# Patient Record
Sex: MALE | Race: WHITE | NOT HISPANIC OR LATINO | Employment: OTHER | URBAN - METROPOLITAN AREA
[De-identification: names, ages, dates, MRNs, and addresses within clinical notes are randomized per-mention and may not be internally consistent; named-entity substitution may affect disease eponyms.]

---

## 2023-08-14 ENCOUNTER — OFFICE VISIT (OUTPATIENT)
Dept: PRIMARY CARE CLINIC | Facility: CLINIC | Age: 33
End: 2023-08-14
Payer: OTHER GOVERNMENT

## 2023-08-14 VITALS
HEART RATE: 58 BPM | TEMPERATURE: 98 F | SYSTOLIC BLOOD PRESSURE: 104 MMHG | RESPIRATION RATE: 16 BRPM | WEIGHT: 198.38 LBS | HEIGHT: 71 IN | DIASTOLIC BLOOD PRESSURE: 68 MMHG | OXYGEN SATURATION: 98 % | BODY MASS INDEX: 27.77 KG/M2

## 2023-08-14 DIAGNOSIS — B35.4 TINEA CORPORIS: ICD-10-CM

## 2023-08-14 DIAGNOSIS — Z00.00 ROUTINE GENERAL MEDICAL EXAMINATION AT A HEALTH CARE FACILITY: Primary | ICD-10-CM

## 2023-08-14 PROCEDURE — 99385 PREV VISIT NEW AGE 18-39: CPT | Mod: S$GLB,,, | Performed by: INTERNAL MEDICINE

## 2023-08-14 PROCEDURE — 99385 PR PREVENTIVE VISIT,NEW,18-39: ICD-10-PCS | Mod: S$GLB,,, | Performed by: INTERNAL MEDICINE

## 2023-08-14 RX ORDER — KETOCONAZOLE 20 MG/G
CREAM TOPICAL DAILY
Qty: 30 G | Refills: 2 | Status: SHIPPED | OUTPATIENT
Start: 2023-08-14 | End: 2023-08-14 | Stop reason: SDUPTHER

## 2023-08-14 RX ORDER — KETOCONAZOLE 20 MG/G
CREAM TOPICAL DAILY
Qty: 30 G | Refills: 2 | Status: SHIPPED | OUTPATIENT
Start: 2023-08-14 | End: 2023-10-31

## 2023-08-14 NOTE — TELEPHONE ENCOUNTER
----- Message from Ellen Winchester sent at 8/14/2023  1:39 PM CDT -----  Contact: pt  Pt calling about script for ketoconazole (NIZORAL) 2 % cream pt needs [facility] because [facility] not take .    [facility]  [address] [place] [id number]  [phone]    Thanks,

## 2023-08-14 NOTE — PROGRESS NOTES
"  Subjective:      Patient ID: Abran Childers is a 33 y.o. male.    Chief Complaint: Rash (Pt c/o rash broke out all over body in June when he moved from [place] and its still on some parts of body.   The rash itch sometimes but most of the time it don't)    :  Patient with no known medical problem presented to establish care and for wellness exam     Patient also complains of rash on chest x 3 months.  Rashes irregular in shape just below the nipple on both sides, patient denies any itching oozing or discharge.  Patient thinks that the rash seems to be spreading/increasing.  Patient denies putting any over-the-counter medication    Review of Systems   Constitutional:  Negative for chills, diaphoresis, fever, malaise/fatigue and weight loss.   HENT:  Negative for congestion, ear pain, sinus pain, sore throat and tinnitus.    Eyes:  Negative for blurred vision and photophobia.   Respiratory:  Negative for cough, hemoptysis, shortness of breath and wheezing.    Cardiovascular:  Negative for chest pain, palpitations, orthopnea, leg swelling and PND.   Gastrointestinal:  Negative for abdominal pain, blood in stool, constipation, diarrhea, heartburn, melena, nausea and vomiting.   Genitourinary:  Negative for dysuria, frequency and urgency.   Musculoskeletal:  Negative for back pain, myalgias and neck pain.   Skin:  Positive for rash.   Neurological:  Negative for dizziness, tremors, seizures, loss of consciousness and weakness.   Endo/Heme/Allergies:  Negative for polydipsia.   Psychiatric/Behavioral:  Negative for depression and hallucinations. The patient does not have insomnia.      Objective:   /68 (BP Location: Left arm, Patient Position: Sitting, BP Method: Large (Automatic))   Pulse (!) 58   Temp 97.6 °F (36.4 °C) (Temporal)   Resp 16   Ht 5' 11" (1.803 m)   Wt 90 kg (198 lb 6.4 oz)   SpO2 98%   BMI 27.67 kg/m²     Physical Exam  Constitutional:       General: He is not in acute distress.     " Appearance: He is not diaphoretic.   Neck:      Thyroid: No thyromegaly.   Cardiovascular:      Rate and Rhythm: Normal rate and regular rhythm.      Heart sounds: Normal heart sounds. No murmur heard.  Pulmonary:      Effort: Pulmonary effort is normal. No respiratory distress.      Breath sounds: Normal breath sounds. No wheezing.   Abdominal:      General: Bowel sounds are normal. There is no distension.      Palpations: Abdomen is soft.      Tenderness: There is no abdominal tenderness.   Lymphadenopathy:      Cervical: No cervical adenopathy.   Skin:     Comments: Different size area of Yellowish discoloration of the skin noted on the chest. These are of different shapes with one circular lesion noted with central sparing and scaling on the left side of the chest.    Neurological:      Mental Status: He is alert and oriented to person, place, and time.   Psychiatric:         Behavior: Behavior normal.         Thought Content: Thought content normal.         Judgment: Judgment normal.       Assessment:       ICD-10-CM ICD-9-CM   1. Routine general medical examination at a health care facility  Z00.00 V70.0   2. Tinea corporis  B35.4 110.5       Plan:      Patient is here for wellness exam.  Will order annual  labs  Will screen for hepatitis-C and HIV  Patient has rash on the chest that is probably tinea corporis will treat with ketoconazole cream  Advised patient to keep the area dry and clean

## 2023-08-14 NOTE — TELEPHONE ENCOUNTER
----- Message from Luz Jain sent at 8/14/2023 11:37 AM CDT -----  Contact: Patient  Patient called to consult with nurse or staff regarding his medication. He wanted to speak with office about getting his medication called out to a pharmacy that accepts his insurance. He would like a call back regarding this and can be reached at [phone]. Thanks/

## 2023-10-31 ENCOUNTER — OFFICE VISIT (OUTPATIENT)
Dept: PRIMARY CARE CLINIC | Facility: CLINIC | Age: 33
End: 2023-10-31
Payer: OTHER GOVERNMENT

## 2023-10-31 VITALS
HEART RATE: 48 BPM | TEMPERATURE: 98 F | WEIGHT: 200.63 LBS | SYSTOLIC BLOOD PRESSURE: 110 MMHG | RESPIRATION RATE: 18 BRPM | BODY MASS INDEX: 28.09 KG/M2 | OXYGEN SATURATION: 98 % | DIASTOLIC BLOOD PRESSURE: 67 MMHG | HEIGHT: 71 IN

## 2023-10-31 DIAGNOSIS — J06.9 UPPER RESPIRATORY TRACT INFECTION, UNSPECIFIED TYPE: Primary | ICD-10-CM

## 2023-10-31 PROCEDURE — 99214 OFFICE O/P EST MOD 30 MIN: CPT | Mod: S$GLB,,, | Performed by: INTERNAL MEDICINE

## 2023-10-31 PROCEDURE — 99214 PR OFFICE/OUTPT VISIT, EST, LEVL IV, 30-39 MIN: ICD-10-PCS | Mod: S$GLB,,, | Performed by: INTERNAL MEDICINE

## 2023-10-31 RX ORDER — AZITHROMYCIN 250 MG/1
TABLET, FILM COATED ORAL
Qty: 6 TABLET | Refills: 0 | Status: SHIPPED | OUTPATIENT
Start: 2023-10-31 | End: 2023-11-05

## 2023-10-31 NOTE — PROGRESS NOTES
"  Subjective:      Patient ID: Abran Childers is a 33 y.o. male.    Chief Complaint: Sore Throat (Pt c/o sore throat and migraines x3 days. )    :  Patient with no known medical problem presented today with complains of sore throat for 3 days.  Patient denies any runny nose, nasal congestion, postnasal drip.  Patient denies any fever, chills, cough, wheezing or shortness of breath.  Patient reports some muscle ache and headaches.  Headache is mild-to-moderate in intensity, involving the whole head.  Patient denies any sick contact    Review of Systems   Constitutional:  Negative for chills, diaphoresis, fever, malaise/fatigue and weight loss.   HENT:  Positive for sore throat. Negative for congestion, ear pain, sinus pain and tinnitus.    Eyes:  Negative for blurred vision and photophobia.   Respiratory:  Negative for cough, hemoptysis, shortness of breath and wheezing.    Cardiovascular:  Negative for chest pain, palpitations, orthopnea, leg swelling and PND.   Gastrointestinal:  Negative for abdominal pain, blood in stool, constipation, diarrhea, heartburn, melena, nausea and vomiting.   Genitourinary:  Negative for dysuria, frequency and urgency.   Musculoskeletal:  Negative for back pain, myalgias and neck pain.   Skin:  Negative for rash.   Neurological:  Negative for dizziness, tremors, seizures, loss of consciousness and weakness.   Endo/Heme/Allergies:  Negative for polydipsia.   Psychiatric/Behavioral:  Negative for depression and hallucinations. The patient does not have insomnia.      Objective:   /67 (BP Location: Right arm, Patient Position: Sitting, BP Method: Large (Automatic))   Pulse (!) 48   Temp 97.9 °F (36.6 °C) (Oral)   Resp 18   Ht 5' 11" (1.803 m)   Wt 91 kg (200 lb 9.6 oz)   SpO2 98%   BMI 27.98 kg/m²     Physical Exam  Constitutional:       General: He is not in acute distress.     Appearance: He is not diaphoretic.   HENT:      Ears:      Comments: Bilateral tympanic membrane " are bulging.   Mild erythema of left tympanic membrane     Mouth/Throat:      Pharynx: No oropharyngeal exudate or posterior oropharyngeal erythema.   Neck:      Thyroid: No thyromegaly.   Cardiovascular:      Rate and Rhythm: Normal rate and regular rhythm.      Heart sounds: Normal heart sounds. No murmur heard.  Pulmonary:      Effort: Pulmonary effort is normal. No respiratory distress.      Breath sounds: Normal breath sounds. No wheezing.   Abdominal:      General: Bowel sounds are normal. There is no distension.      Palpations: Abdomen is soft.      Tenderness: There is no abdominal tenderness.   Lymphadenopathy:      Cervical: No cervical adenopathy.   Neurological:      Mental Status: He is alert and oriented to person, place, and time.   Psychiatric:         Behavior: Behavior normal.         Thought Content: Thought content normal.         Judgment: Judgment normal.       Assessment:       ICD-10-CM ICD-9-CM   1. Upper respiratory tract infection, unspecified type  J06.9 465.9       Plan:     Patient has viral URI.   Will treat with Z-Bibi  Advised patient to wear mask for next few days of viral infection  Advised to keep himself hydrated.   If patient develops fever chill or anything changes advised patient to call my office    Medication List with Changes/Refills   New Medications    AZITHROMYCIN (Z-BIBI) 250 MG TABLET    Take 2 tablets by mouth on day 1; Take 1 tablet by mouth on days 2-5   Discontinued Medications    KETOCONAZOLE (NIZORAL) 2 % CREAM    Apply topically once daily.

## 2024-04-23 ENCOUNTER — PATIENT MESSAGE (OUTPATIENT)
Dept: PRIMARY CARE CLINIC | Facility: CLINIC | Age: 34
End: 2024-04-23
Payer: OTHER GOVERNMENT

## 2024-04-23 NOTE — TELEPHONE ENCOUNTER
"Patient sent a msg via [employer], "Good Afternoon ,      I have been suffering with back & neck pain for some time now. I have been doing exercises to try to help on my own, but the pain is getting worse and more frequent. I would like to get a referral for it, and can you also send it to my insurance for pre authorization.      [facility] in [place] is working with my insurance, so it would be great if you could send the referral to them.      Thank you"  "

## 2024-05-03 ENCOUNTER — OFFICE VISIT (OUTPATIENT)
Dept: PRIMARY CARE CLINIC | Facility: CLINIC | Age: 34
End: 2024-05-03
Payer: OTHER GOVERNMENT

## 2024-05-03 VITALS
HEIGHT: 71 IN | HEART RATE: 54 BPM | TEMPERATURE: 98 F | OXYGEN SATURATION: 99 % | RESPIRATION RATE: 16 BRPM | WEIGHT: 185 LBS | BODY MASS INDEX: 25.9 KG/M2 | DIASTOLIC BLOOD PRESSURE: 65 MMHG | SYSTOLIC BLOOD PRESSURE: 112 MMHG

## 2024-05-03 DIAGNOSIS — G89.29 CHRONIC BILATERAL BACK PAIN, UNSPECIFIED BACK LOCATION: ICD-10-CM

## 2024-05-03 DIAGNOSIS — Z00.00 ROUTINE GENERAL MEDICAL EXAMINATION AT A HEALTH CARE FACILITY: Primary | ICD-10-CM

## 2024-05-03 DIAGNOSIS — M54.9 CHRONIC BILATERAL BACK PAIN, UNSPECIFIED BACK LOCATION: ICD-10-CM

## 2024-05-03 DIAGNOSIS — B35.4 TINEA CORPORIS: ICD-10-CM

## 2024-05-03 PROCEDURE — 99214 OFFICE O/P EST MOD 30 MIN: CPT | Mod: S$GLB,,, | Performed by: INTERNAL MEDICINE

## 2024-05-03 RX ORDER — KETOCONAZOLE 20 MG/G
CREAM TOPICAL DAILY
Qty: 30 G | Refills: 1 | Status: SHIPPED | OUTPATIENT
Start: 2024-05-03

## 2024-05-03 RX ORDER — CYCLOBENZAPRINE HCL 5 MG
5 TABLET ORAL 3 TIMES DAILY
Qty: 30 TABLET | Refills: 0 | Status: SHIPPED | OUTPATIENT
Start: 2024-05-03 | End: 2024-05-13

## 2024-05-03 NOTE — PROGRESS NOTES
Subjective:      Patient ID: Abran Childers is a 33 y.o. male.    Chief Complaint: Back Pain (C/o generalized back pain, associated neck and bilateral shoulder pain, worse w/ activity )    :  Patient is here for annual wellness exam.    Patient today complains of diffuse back pain x long.  Patient reports pain is getting worse in last few years.  Pain is constant, in intensity but can get really bad.  No exacerbating or relieving factors known.  Patient also complains of neck pain, that is constant without any radiation down the arms, no tingling numbness or weakness in arms or legs.  Patient has feeling of stiffness in neck and lower back.    Patient also complained of rash on forearm and lower abdomen.  The rash is round slightly raised, itchy with some scaling.  Patient had this rash before and was treated with ketoconazole cream with much help    Review of Systems   Constitutional:  Negative for chills, diaphoresis, fever, malaise/fatigue and weight loss.   HENT:  Negative for congestion, ear pain, sinus pain, sore throat and tinnitus.    Eyes:  Negative for blurred vision and photophobia.   Respiratory:  Negative for cough, hemoptysis, shortness of breath and wheezing.    Cardiovascular:  Negative for chest pain, palpitations, orthopnea, leg swelling and PND.   Gastrointestinal:  Negative for abdominal pain, blood in stool, constipation, diarrhea, heartburn, melena, nausea and vomiting.   Genitourinary:  Negative for dysuria, frequency and urgency.   Musculoskeletal:  Positive for back pain. Negative for myalgias and neck pain.   Skin:  Negative for rash.   Neurological:  Negative for dizziness, tremors, seizures, loss of consciousness and weakness.   Endo/Heme/Allergies:  Negative for polydipsia.   Psychiatric/Behavioral:  Negative for depression and hallucinations. The patient does not have insomnia.      Objective:   /65 (BP Location: Left arm, Patient Position: Sitting, BP Method: Medium  "(Automatic))   Pulse (!) 54   Temp 98.1 °F (36.7 °C) (Oral)   Resp 16   Ht 5' 11" (1.803 m)   Wt 83.9 kg (185 lb)   SpO2 99%   BMI 25.80 kg/m²     Physical Exam  Constitutional:       General: He is not in acute distress.     Appearance: He is not diaphoretic.   Neck:      Thyroid: No thyromegaly.   Cardiovascular:      Rate and Rhythm: Normal rate and regular rhythm.      Heart sounds: Normal heart sounds. No murmur heard.  Pulmonary:      Effort: Pulmonary effort is normal. No respiratory distress.      Breath sounds: Normal breath sounds. No wheezing.   Abdominal:      General: Bowel sounds are normal. There is no distension.      Palpations: Abdomen is soft.      Tenderness: There is no abdominal tenderness.   Musculoskeletal:      Comments: No Back Tenderness noted at this time  Bilateral arm and leg strength is normal   Lymphadenopathy:      Cervical: No cervical adenopathy.   Skin:     Comments: 0.5 cm slightly raised erythematous lesion noted on right forearm.    Neurological:      Mental Status: He is alert and oriented to person, place, and time.   Psychiatric:         Behavior: Behavior normal.         Thought Content: Thought content normal.         Judgment: Judgment normal.       Assessment:       ICD-10-CM ICD-9-CM   1. Routine general medical examination at a health care facility  Z00.00 V70.0   2. Chronic bilateral back pain, unspecified back location  M54.9 724.5    G89.29 338.29   3. Tinea corporis  B35.4 110.5       Plan:     Patient is here for wellness exam Will order annual labs  Will screen for hepatitis-C and HIV  Patient has chronic low back pain that seem muscular in origin  Will use muscle relaxer  Advised patient about sedative effect of the medication  Will also refer to physical therapy for exercise and better posture.   Patient has tinea corporis Will use ketoconazole cream      Medication List with Changes/Refills   New Medications    CYCLOBENZAPRINE (FLEXERIL) 5 MG TABLET    " Take 1 tablet (5 mg total) by mouth 3 (three) times daily. for 10 days    KETOCONAZOLE (NIZORAL) 2 % CREAM    Apply topically once daily.

## 2024-05-08 ENCOUNTER — OFFICE VISIT (OUTPATIENT)
Dept: FAMILY MEDICINE | Facility: CLINIC | Age: 34
End: 2024-05-08
Payer: OTHER GOVERNMENT

## 2024-05-08 VITALS
DIASTOLIC BLOOD PRESSURE: 63 MMHG | WEIGHT: 179 LBS | HEIGHT: 71 IN | BODY MASS INDEX: 25.06 KG/M2 | OXYGEN SATURATION: 95 % | SYSTOLIC BLOOD PRESSURE: 100 MMHG | HEART RATE: 77 BPM | TEMPERATURE: 99 F

## 2024-05-08 DIAGNOSIS — J30.2 SEASONAL ALLERGIC RHINITIS, UNSPECIFIED TRIGGER: ICD-10-CM

## 2024-05-08 DIAGNOSIS — J02.9 SORE THROAT: Primary | ICD-10-CM

## 2024-05-08 LAB
CTP QC/QA: YES
S PYO RRNA THROAT QL PROBE: NEGATIVE

## 2024-05-08 PROCEDURE — 99213 OFFICE O/P EST LOW 20 MIN: CPT | Mod: S$GLB,,, | Performed by: NURSE PRACTITIONER

## 2024-05-08 PROCEDURE — 87880 STREP A ASSAY W/OPTIC: CPT | Mod: QW,,, | Performed by: NURSE PRACTITIONER

## 2024-05-08 NOTE — PROGRESS NOTES
"Patient ID: Abran Childers  MRN: [id number]      History of Present Illness:  The patient is a 33 y.o. White male who presents to clinic for evaluation of sore throat. He denies any fever , ear pain , or chills, but does report some myalgias and headache, however the sore throat is the worst of his sx. Sx started Monday night so he stayed home from work yesterday and today. He works for the [employer] and his supervisor at work wanted him to get a clearance to return to work. He is still having some discomfort with swallowing but is taking throat lozenges and ibuprofen with mod relief.     Allergies: Patient has No Known Allergies.     Smoking status:  Social History     Tobacco Use   Smoking Status Former    Types: Pipe, Cigars    Start date:     Quit date:     Years since quittin.3   Smokeless Tobacco Current    Types: Chew       Problem List:  There is no problem list on file for this patient.       Current Medications:  Current Outpatient Medications   Medication Instructions    cyclobenzaprine (FLEXERIL) 5 mg, Oral, 3 times daily    ketoconazole (NIZORAL) 2 % cream Topical (Top), Daily       Review of Systems   Constitutional: Negative.    HENT:  Positive for sore throat. Negative for nasal congestion.    Eyes: Negative.    Respiratory: Negative.     Cardiovascular: Negative.    Gastrointestinal: Negative.    Endocrine: Negative.    Genitourinary: Negative.    Musculoskeletal:  Positive for myalgias.   Integumentary:  Negative.   Allergic/Immunologic: Negative.    Neurological: Negative.    Hematological: Negative.    Psychiatric/Behavioral: Negative.          Visit Vitals  /63 (BP Location: Left arm, Patient Position: Sitting, BP Method: Medium (Automatic))   Pulse 77   Temp 98.7 °F (37.1 °C) (Oral)   Ht 5' 11" (1.803 m)   Wt 81.2 kg (179 lb)   SpO2 95%   BMI 24.97 kg/m²       Physical Exam  Vitals and nursing note reviewed.   Constitutional:       Appearance: Normal appearance.   HENT:     "  Head: Normocephalic.      Nose: Nose normal.      Mouth/Throat:      Mouth: Mucous membranes are moist.      Comments: There is some erythema to the posterior pharynx however no enlargement of tonsils and there is no exudate.   Eyes:      Conjunctiva/sclera: Conjunctivae normal.   Cardiovascular:      Rate and Rhythm: Normal rate and regular rhythm.   Pulmonary:      Effort: Pulmonary effort is normal.      Breath sounds: Normal breath sounds.   Musculoskeletal:         General: Normal range of motion.      Cervical back: Normal range of motion.   Skin:     General: Skin is warm and dry.   Neurological:      General: No focal deficit present.      Mental Status: He is alert.   Psychiatric:         Mood and Affect: Mood normal.         Behavior: Behavior normal.          Assessment & Plan:  1. Sore throat  -     POCT rapid strep A  Reviewed results of strep test which is negative, however discussed that may be due to allergies , ie nasal drainage or viral. Recommended rest today, increase fluids, alternate 1000 mg of tylenol and 600 mg of ibuprofen q 4 hours for relief of myalgias, headache and sore throat pain . Warm salt water gargles and cepacol lozenges .     2. Seasonal allergic rhinitis, unspecified trigger  Advised use of normal saline irrigation, use of flonase and an oral antihistamine to reduce symptoms.        Future Appointments   Date Time Provider Department Center   8/14/2024  8:00 AM Norma Aden MD [facility]       Lab Frequency Next Occurrence   Hepatitis C Antibody Once 08/14/2023   HIV 1/2 Ag/Ab (4th Gen) Once 08/14/2023   CBC Auto Differential Once 05/03/2024   TSH Once 05/03/2024   Lipid Panel Once 05/03/2024   Comprehensive Metabolic Panel Once 05/03/2024   Hepatitis C Antibody Once 05/03/2024   HIV 1/2 Ag/Ab (4th Gen) Once 05/03/2024   Ambulatory referral/consult to Physical/Occupational Therapy Once 05/10/2024         Follow up if symptoms worsen or fail to  improve.      Nano Lynn, NP

## 2024-05-08 NOTE — LETTER
May 8, 2024    Abran Childers  [address]  [place] [address]             [facility]  [facility] [address] [address]  Phone: [phone]  Fax: [phone]   May 8, 2024     Patient: Abran Childers   YOB: 1990   Date of Visit: 5/8/2024       To Whom it May Concern:    Abran Childers was seen in our clinic on 5/8/2024 . He may return to work on 05/09/24 without restrictions.     Please excuse him from any work missed.    If you have any questions or concerns, please don't hesitate to call.    Sincerely,         Nano Lynn, NP

## 2024-07-29 ENCOUNTER — PATIENT MESSAGE (OUTPATIENT)
Dept: FAMILY MEDICINE | Facility: CLINIC | Age: 34
End: 2024-07-29
Payer: OTHER GOVERNMENT

## 2024-07-31 ENCOUNTER — PATIENT MESSAGE (OUTPATIENT)
Dept: FAMILY MEDICINE | Facility: CLINIC | Age: 34
End: 2024-07-31

## 2024-07-31 ENCOUNTER — TELEPHONE (OUTPATIENT)
Dept: FAMILY MEDICINE | Facility: CLINIC | Age: 34
End: 2024-07-31

## 2024-07-31 ENCOUNTER — OFFICE VISIT (OUTPATIENT)
Dept: FAMILY MEDICINE | Facility: CLINIC | Age: 34
End: 2024-07-31
Payer: OTHER GOVERNMENT

## 2024-07-31 VITALS
OXYGEN SATURATION: 99 % | BODY MASS INDEX: 24.64 KG/M2 | HEIGHT: 71 IN | DIASTOLIC BLOOD PRESSURE: 74 MMHG | WEIGHT: 176 LBS | SYSTOLIC BLOOD PRESSURE: 102 MMHG | HEART RATE: 57 BPM

## 2024-07-31 DIAGNOSIS — Z31.69 INFERTILITY COUNSELING: ICD-10-CM

## 2024-07-31 DIAGNOSIS — M54.9 CHRONIC BILATERAL BACK PAIN, UNSPECIFIED BACK LOCATION: ICD-10-CM

## 2024-07-31 DIAGNOSIS — G89.29 CHRONIC BILATERAL BACK PAIN, UNSPECIFIED BACK LOCATION: ICD-10-CM

## 2024-07-31 DIAGNOSIS — Z00.00 ANNUAL PHYSICAL EXAM: Primary | ICD-10-CM

## 2024-07-31 LAB
ABS NRBC COUNT: 0 THOU/UL (ref 0–0.01)
ABSOLUTE BASOPHIL: 0 10*3/UL (ref 0–0.3)
ABSOLUTE EOSINOPHIL: 0.3 10*3/UL (ref 0–0.6)
ABSOLUTE IMMATURE GRAN: 0.01 THOU/UL (ref 0–0.03)
ABSOLUTE LYMPHOCYTE: 1.8 10*3/UL (ref 1.2–4)
ABSOLUTE MONOCYTE: 0.5 10*3/UL (ref 0.1–0.8)
ALBUMIN SERPL BCP-MCNC: 4.4 G/DL (ref 3.4–5)
ALP SERPL-CCNC: 47 U/L (ref 45–117)
ALT SERPL W P-5'-P-CCNC: 18 U/L (ref 16–61)
ANION GAP SERPL CALC-SCNC: 5 MMOL/L (ref 3–11)
AST SERPL-CCNC: 15 U/L (ref 15–37)
BASOPHILS NFR BLD: 0.6 % (ref 0–3)
BILIRUB SERPL-MCNC: 1.1 MG/DL (ref 0.2–1)
BUN SERPL-MCNC: 24 MG/DL (ref 7–18)
BUN/CREAT SERPL: 19.2 RATIO
CALCIUM SERPL-MCNC: 9.8 MG/DL (ref 8.5–10.1)
CHLORIDE SERPL-SCNC: 103 MMOL/L (ref 98–107)
CHOLEST SERPL-MSCNC: 155 MG/DL
CO2 SERPL-SCNC: 29 MMOL/L (ref 21–32)
CREAT SERPL-MCNC: 1.25 MG/DL (ref 0.7–1.3)
CREATININE, URINE: 241 MG/DL (ref 10–175)
EOSINOPHIL NFR BLD: 6.5 % (ref 0–6)
ERYTHROCYTE [DISTWIDTH] IN BLOOD BY AUTOMATED COUNT: 12 % (ref 0–15.5)
ESTIMATED AVG GLUCOSE: 97 MG/DL
GFR ESTIMATION: > 60
GLUCOSE SERPL-MCNC: 85 MG/DL (ref 74–106)
HBA1C MFR BLD: 4.9 % (ref 4.2–6.3)
HCT VFR BLD AUTO: 47.7 % (ref 42–52)
HCV AB SERPL QL IA: NONREACTIVE
HDLC SERPL-MCNC: 70 MG/DL
HGB BLD-MCNC: 16 G/DL (ref 14–18)
HIV-1 AND HIV-2 ANTIBODIES: NEGATIVE
IMMATURE GRANULOCYTES: 0.2 % (ref 0–0.43)
LDLC SERPL CALC-MCNC: 68.6 MG/DL
LYMPHOCYTES NFR BLD: 34.7 % (ref 20–45)
MCH RBC QN AUTO: 29.4 PG (ref 27–32)
MCHC RBC AUTO-ENTMCNC: 33.5 % (ref 32–36)
MCV RBC AUTO: 87.5 FL (ref 80–97)
MICROALBUMIN URINE: 6.3 MG/L (ref 0–20)
MICROALBUMIN/CREATININE RATIO: 2 MG/G (ref 0–30)
MONOCYTES NFR BLD: 9.3 % (ref 2–10)
NEUTROPHILS # BLD AUTO: 2.6 10*3/UL (ref 1.4–7)
NEUTROPHILS NFR BLD: 48.7 % (ref 50–80)
NUCLEATED RED BLOOD CELLS: 0 % (ref 0–0.2)
PLATELETS: 191 10*3/UL (ref 130–400)
PMV BLD AUTO: 12.4 FL (ref 9.2–12.2)
POTASSIUM SERPL-SCNC: 4.3 MMOL/L (ref 3.5–5.1)
PROT SERPL-MCNC: 7.7 G/DL (ref 6.4–8.2)
RBC # BLD AUTO: 5.45 10*6/UL (ref 4.7–6.1)
SODIUM BLD-SCNC: 137 MMOL/L (ref 131–143)
T4 FREE SP9 P CHAL SERPL-SCNC: 1.27 NG/DL (ref 0.76–1.46)
TRIGL SERPL-MCNC: 82 MG/DL (ref 0–149)
TSH SERPL DL<=0.005 MIU/L-ACNC: 1.14 UIU/ML (ref 0.36–3.74)
VLDL CHOLESTEROL: 16 MG/DL
WBC # BLD: 5.3 10*3/UL (ref 4.5–10)

## 2024-07-31 PROCEDURE — 99213 OFFICE O/P EST LOW 20 MIN: CPT | Mod: 25,S$GLB,, | Performed by: NURSE PRACTITIONER

## 2024-07-31 PROCEDURE — 99395 PREV VISIT EST AGE 18-39: CPT | Mod: S$GLB,,, | Performed by: NURSE PRACTITIONER

## 2024-07-31 NOTE — PROGRESS NOTES
"Patient ID: Abran Childers  MRN: [id number]      History of Present Illness:  The patient is a 34 y.o. White male who presents to clinic for his Annual Physical exam as well as c/o chronic back issues he thinks is related to his job. He works for the [employer] which frequently requires some heavy lifting. In addition he and his spouse have been trying to have a baby for over a  year. His wife recently requested a referral to Dr. Car Jorge and he is requesting this as well so he and she can be seen together.     Allergies: Patient has No Known Allergies.     Smoking status:  Social History     Tobacco Use   Smoking Status Former    Types: Pipe, Cigars    Start date:     Quit date:     Years since quittin.5   Smokeless Tobacco Current    Types: Chew       Problem List:  There is no problem list on file for this patient.       Current Medications:  Current Outpatient Medications   Medication Instructions    ketoconazole (NIZORAL) 2 % cream Topical (Top), Daily         Review of Systems   Constitutional: Negative.    HENT: Negative.     Eyes: Negative.    Respiratory: Negative.     Cardiovascular: Negative.    Gastrointestinal: Negative.    Endocrine: Negative.    Genitourinary: Negative.    Musculoskeletal:  Positive for back pain.        Generalized back pain , sees physical therapist.    Integumentary:         Small warts, will freeze off with otc and sometimes in medication office.   Currently has one small wart to middle finger left hand.    Allergic/Immunologic: Negative.    Neurological: Negative.    Hematological: Negative.    Psychiatric/Behavioral: Negative.     All other systems reviewed and are negative.       Visit Vitals  /74 (BP Location: Right arm, Patient Position: Sitting, BP Method: Medium (Manual))   Pulse (!) 57   Ht 5' 11" (1.803 m)   Wt 79.8 kg (176 lb)   SpO2 99%   BMI 24.55 kg/m²       Physical Exam  Vitals and nursing note reviewed.   Constitutional:       Appearance: " Normal appearance.   HENT:      Head: Normocephalic.      Nose: Nose normal.      Mouth/Throat:      Mouth: Mucous membranes are moist.      Pharynx: Oropharynx is clear.   Eyes:      Conjunctiva/sclera: Conjunctivae normal.   Cardiovascular:      Rate and Rhythm: Normal rate and regular rhythm.   Pulmonary:      Effort: Pulmonary effort is normal.      Breath sounds: Normal breath sounds.   Abdominal:      General: Bowel sounds are normal.      Palpations: Abdomen is soft.   Musculoskeletal:         General: Normal range of motion.      Cervical back: Normal range of motion.   Skin:     General: Skin is warm and dry.   Neurological:      General: No focal deficit present.      Mental Status: He is alert.   Psychiatric:         Mood and Affect: Mood normal.         Behavior: Behavior normal.          Assessment & Plan:  1. Annual physical exam  -     CBC Auto Differential; Future; Expected date: 07/31/2024  -     Comprehensive Metabolic Panel; Future; Expected date: 07/31/2024  -     Hemoglobin A1C; Future; Expected date: 07/31/2024  -     Lipid Panel; Future; Expected date: 07/31/2024  -     Microalbumin/Creatinine Ratio, Urine  -     T4, Free; Future; Expected date: 07/31/2024  -     TSH; Future; Expected date: 07/31/2024  -     Microalbumin/Creatinine Ratio, Urine; Future; Expected date: 07/31/2024  -     TESTOSTERONE FREE BIOAVAILABLE & TOTAL; Future; Expected date: 07/31/2024  Mr. Childers said he is very physically active and prior to moving to [place] he played hockey frequently . He does have a slow heart rate, however no associated dizziness and due to the level of physical fitness is likely due to physical conditioning. Will notify of lab results when available.     2. Chronic bilateral back pain, unspecified back location  -     Ambulatory referral/consult to Physical/Occupational Therapy; Future; Expected date: 08/07/2024  Pt notes this has been a chronic issue he feels due to occupation. He frequently will  have some neck , shoulder, upper as well as low back pain.     3. Infertility counseling  -     Ambulatory referral/consult to Infertility; Future; Expected date: 08/07/2024  Referral to be sent to Dr. Car Jorge in [place], [place] per pt request.        Future Appointments   Date Time Provider Department Center   8/14/2024  8:00 AM Norma Aden MD [facility]  [facility]     Lab Frequency Next Occurrence   Hepatitis C Antibody Once 08/14/2023   HIV 1/2 Ag/Ab (4th Gen) Once 08/14/2023   CBC Auto Differential Once 05/03/2024   TSH Once 05/03/2024   Lipid Panel Once 05/03/2024   Comprehensive Metabolic Panel Once 05/03/2024   Hepatitis C Antibody Once 05/03/2024   HIV 1/2 Ag/Ab (4th Gen) Once 05/03/2024   Ambulatory referral/consult to Physical/Occupational Therapy Once 05/10/2024         Patient advised to contact our clinic if they have not heard back regarding labs , diagnostic studies or referrals within 1 week.      .Follow up in about 1 year (around 7/31/2025).      Nano Lynn NP

## 2024-08-01 ENCOUNTER — PATIENT MESSAGE (OUTPATIENT)
Dept: FAMILY MEDICINE | Facility: CLINIC | Age: 34
End: 2024-08-01
Payer: OTHER GOVERNMENT

## 2024-08-01 DIAGNOSIS — Z31.49 OTHER INVESTIGATION AND TESTING FOR PROCREATIVE MANAGEMENT: Primary | ICD-10-CM

## 2024-08-01 NOTE — TELEPHONE ENCOUNTER
Patient message about his lab results his insurance approved the office visit with Dr. Jorge and the referral alone with approval was faxed. Testosterone is not back it takes 3-5 day to returns.

## 2024-08-02 ENCOUNTER — PATIENT MESSAGE (OUTPATIENT)
Dept: FAMILY MEDICINE | Facility: CLINIC | Age: 34
End: 2024-08-02
Payer: OTHER GOVERNMENT

## 2024-08-06 NOTE — TELEPHONE ENCOUNTER
[facility] here in [place] will take his insurance once we get the prior authorization back just need the order to go alone with [facility]ad[facility] [address] [place] [address] [phone]

## 2024-08-07 ENCOUNTER — TELEPHONE (OUTPATIENT)
Dept: FAMILY MEDICINE | Facility: CLINIC | Age: 34
End: 2024-08-07
Payer: OTHER GOVERNMENT

## 2024-08-07 ENCOUNTER — PATIENT MESSAGE (OUTPATIENT)
Dept: FAMILY MEDICINE | Facility: CLINIC | Age: 34
End: 2024-08-07
Payer: OTHER GOVERNMENT

## 2024-08-07 DIAGNOSIS — M72.2 PLANTAR FASCIITIS: Primary | ICD-10-CM

## 2024-08-17 LAB
SEX HORMONE BINDING GLOBULIN: 37 NMOL/L (ref 17–56)
TESTOST FREE SERPL-MCNC: 62 PG/ML (ref 47–244)
TESTOSTERONE FREE PERCENT: 1.7 % (ref 1.6–2.9)
TESTOSTERONE, ADULT MALE: 361 NG/DL (ref 300–1080)

## 2024-08-19 ENCOUNTER — PATIENT MESSAGE (OUTPATIENT)
Dept: FAMILY MEDICINE | Facility: CLINIC | Age: 34
End: 2024-08-19
Payer: OTHER GOVERNMENT

## 2024-08-25 ENCOUNTER — PATIENT MESSAGE (OUTPATIENT)
Dept: FAMILY MEDICINE | Facility: CLINIC | Age: 34
End: 2024-08-25
Payer: OTHER GOVERNMENT

## 2024-09-03 ENCOUNTER — OFFICE VISIT (OUTPATIENT)
Dept: URGENT CARE | Facility: CLINIC | Age: 34
End: 2024-09-03
Payer: OTHER GOVERNMENT

## 2024-09-03 VITALS
HEART RATE: 60 BPM | BODY MASS INDEX: 24.64 KG/M2 | WEIGHT: 176 LBS | RESPIRATION RATE: 16 BRPM | HEIGHT: 71 IN | SYSTOLIC BLOOD PRESSURE: 126 MMHG | TEMPERATURE: 98 F | OXYGEN SATURATION: 98 % | DIASTOLIC BLOOD PRESSURE: 83 MMHG

## 2024-09-03 DIAGNOSIS — T14.8XXA ANIMAL BITE: Primary | ICD-10-CM

## 2024-09-03 PROCEDURE — 99213 OFFICE O/P EST LOW 20 MIN: CPT | Mod: S$GLB,,, | Performed by: STUDENT IN AN ORGANIZED HEALTH CARE EDUCATION/TRAINING PROGRAM

## 2024-09-03 RX ORDER — MUPIROCIN 20 MG/G
OINTMENT TOPICAL
Status: COMPLETED | OUTPATIENT
Start: 2024-09-03 | End: 2024-09-03

## 2024-09-03 RX ORDER — MUPIROCIN 20 MG/G
OINTMENT TOPICAL 2 TIMES DAILY
Qty: 15 G | Refills: 0 | Status: SHIPPED | OUTPATIENT
Start: 2024-09-03 | End: 2024-09-08

## 2024-09-03 RX ADMIN — MUPIROCIN: 20 OINTMENT TOPICAL at 08:09

## 2024-09-03 NOTE — PROGRESS NOTES
"Subjective:      Patient ID: Abran Childers is a 34 y.o. male.    Vitals:  height is 5' 11" (1.803 m) and weight is 79.8 kg (176 lb). His oral temperature is 98.1 °F (36.7 °C). His blood pressure is 126/83 and his pulse is 60. His respiration is 16 and oxygen saturation is 98%.     Chief Complaint: Animal Bite    Patient complaints: opossum bite to left index finger, he was trying to get the baby opossum out of his yard before his dogs got to it  -the animal did not bite him that deeply  -no meds taken        Animal Bite   The incident occurred just prior to arrival. The incident occurred at home. He came to the ER via personal transport. Head/neck injury location: finger. There is an injury to the Left index finger. The pain is mild. It is unlikely that a foreign body is present. He is Right-handed. His tetanus status is UTD. There were no sick contacts.       Skin:  Positive for puncture wound.    Objective:     Physical Exam    Assessment:     1. Animal bite        Plan:       Animal bite  -     mupirocin (BACTROBAN) 2 % ointment; Apply topically 2 (two) times daily. for 5 days  Dispense: 15 g; Refill: 0  -     mupirocin 2 % ointment    Please follow up with your primary care provider within 2-5 days if your signs and symptoms have not resolved or worsen.     If your condition worsens or fails to improve we recommend that you receive another evaluation at the emergency room immediately or contact your primary medical clinic to discuss your concerns.   You must understand that you have received an Urgent Care treatment only and that you may be released before all of your medical problems are known or treated. You, the patient, will arrange for follow up care as instructed.         If you notice any drainage, redness, pus coming out of the animal bite then please call [phone] and we will send you some antibiotics or feel free to return to the clinic.You can use Tylenol or ibuprofen as needed for any " pain.      Medical Decision Making:   Differential Diagnosis:   Animal bite  Urgent Care Management:  Patient complaints: opossum bite to left index finger, he was trying to get the baby opossum out of his yard before his dogs got to it.  -the animal did not bite him that deeply  -no meds taken  Please see uploaded picture.   RT spoke with animal control who stated that opossums bites do not need to be reported and that they DO NOT carry rabies.  Pt was prescribed some mupirocin for a few days and was given return precautions. Pt does not require oral abx at this time.

## 2024-09-03 NOTE — PATIENT INSTRUCTIONS
Please follow up with your primary care provider within 2-5 days if your signs and symptoms have not resolved or worsen.     If your condition worsens or fails to improve we recommend that you receive another evaluation at the emergency room immediately or contact your primary medical clinic to discuss your concerns.   You must understand that you have received an Urgent Care treatment only and that you may be released before all of your medical problems are known or treated. You, the patient, will arrange for follow up care as instructed.         If you notice any drainage, redness, pus coming out of the animal bite then please call [phone] and we will send you some antibiotics or feel free to return to the clinic.You can use Tylenol or ibuprofen as needed for any pain.

## 2024-09-18 DIAGNOSIS — M72.2 PLANTAR FASCIAL FIBROMATOSIS: Primary | ICD-10-CM

## 2024-10-07 ENCOUNTER — OFFICE VISIT (OUTPATIENT)
Dept: URGENT CARE | Facility: CLINIC | Age: 34
End: 2024-10-07
Payer: OTHER GOVERNMENT

## 2024-10-07 VITALS
WEIGHT: 176 LBS | HEART RATE: 60 BPM | HEIGHT: 71 IN | SYSTOLIC BLOOD PRESSURE: 113 MMHG | RESPIRATION RATE: 16 BRPM | OXYGEN SATURATION: 98 % | TEMPERATURE: 99 F | BODY MASS INDEX: 24.64 KG/M2 | DIASTOLIC BLOOD PRESSURE: 76 MMHG

## 2024-10-07 DIAGNOSIS — J02.9 SORE THROAT: ICD-10-CM

## 2024-10-07 DIAGNOSIS — J01.90 ACUTE RHINOSINUSITIS: ICD-10-CM

## 2024-10-07 DIAGNOSIS — J06.9 UPPER RESPIRATORY INFECTION, VIRAL: Primary | ICD-10-CM

## 2024-10-07 LAB
CTP QC/QA: YES
CTP QC/QA: YES
MOLECULAR STREP A: NEGATIVE
SARS-COV-2 AG RESP QL IA.RAPID: NEGATIVE

## 2024-10-07 PROCEDURE — 87811 SARS-COV-2 COVID19 W/OPTIC: CPT | Mod: QW,S$GLB,, | Performed by: NURSE PRACTITIONER

## 2024-10-07 PROCEDURE — 87651 STREP A DNA AMP PROBE: CPT | Mod: QW,,, | Performed by: NURSE PRACTITIONER

## 2024-10-07 PROCEDURE — 99214 OFFICE O/P EST MOD 30 MIN: CPT | Mod: S$GLB,,, | Performed by: NURSE PRACTITIONER

## 2024-10-07 NOTE — LETTER
October 7, 2024      [place] [facility]  [facility] [facility] [place] [address]  Phone: [phone]  Fax: [phone]       Patient: Abran Childers   YOB: 1990  Date of Visit: 10/07/2024    To Whom It May Concern:    Shalini Childers  was at [facility] on 10/07/2024. The patient may return to work on 10/9/2024 with no restrictions. If you have any questions or concerns, or if I can be of further assistance, please do not hesitate to contact me.    Sincerely,    Lilliana Kate NP

## 2024-10-07 NOTE — PROGRESS NOTES
"Subjective:      Patient ID: Abran Childers is a 34 y.o. male.    Vitals:  height is 5' 11" (1.803 m) and weight is 79.8 kg (176 lb). His temperature is 98.7 °F (37.1 °C). His blood pressure is 113/76 and his pulse is 60. His respiration is 16 and oxygen saturation is 98%.     Chief Complaint: Sore Throat and Nasal Congestion    Pt reports c/o sore throat and nasal congestion since yesterday.   Denies fever.  Wife is sick with similar symptoms.    Sore Throat   Associated symptoms include congestion. Pertinent negatives include no coughing, headaches, neck pain or shortness of breath.     Constitution: Positive for fatigue. Negative for activity change, appetite change and fever.   HENT:  Positive for congestion, postnasal drip, sinus pressure, sore throat and voice change.    Neck: Negative for neck pain, neck stiffness and painful lymph nodes.   Respiratory:  Negative for cough, shortness of breath, wheezing and asthma.    Musculoskeletal:  Positive for muscle ache.   Skin:  Negative for color change, pale and rash.   Allergic/Immunologic: Negative for asthma.   Neurological:  Negative for dizziness, headaches, disorientation and altered mental status.   Hematologic/Lymphatic: Negative for swollen lymph nodes.   Psychiatric/Behavioral:  Negative for altered mental status and disorientation.       Objective:     Physical Exam   Constitutional: He is oriented to person, place, and time. He appears well-developed. He is cooperative.  Non-toxic appearance. He does not appear ill. No distress.   HENT:   Head: Normocephalic and atraumatic.   Ears:   Right Ear: Hearing normal.   Left Ear: Hearing and tympanic membrane normal.   Nose: Mucosal edema present. No rhinorrhea or nasal deformity. No epistaxis. Right sinus exhibits no maxillary sinus tenderness and no frontal sinus tenderness. Left sinus exhibits no maxillary sinus tenderness and no frontal sinus tenderness.   Mouth/Throat: Uvula is midline and mucous " membranes are normal. No trismus in the jaw. Normal dentition. No uvula swelling. Posterior oropharyngeal erythema and cobblestoning present. No oropharyngeal exudate or posterior oropharyngeal edema. No tonsillar exudate.   Eyes: Conjunctivae and lids are normal. No scleral icterus.   Neck: Trachea normal and phonation normal. Neck supple. No edema present. No erythema present. No neck rigidity present.   Cardiovascular: Normal rate, regular rhythm, normal heart sounds and normal pulses.   Pulmonary/Chest: Effort normal and breath sounds normal. No respiratory distress. He has no decreased breath sounds. He has no rhonchi.   Abdominal: Normal appearance.   Musculoskeletal: Normal range of motion.         General: No deformity. Normal range of motion.   Lymphadenopathy:     He has cervical adenopathy.        Right cervical: Superficial cervical adenopathy present.        Left cervical: Superficial cervical adenopathy present.   Neurological: He is alert and oriented to person, place, and time. He exhibits normal muscle tone. Coordination normal.   Skin: Skin is warm, dry, intact, not diaphoretic and not pale.   Psychiatric: His speech is normal and behavior is normal. Judgment and thought content normal.   Nursing note and vitals reviewed.      Assessment:     1. Upper respiratory infection, viral    2. Acute rhinosinusitis    3. Sore throat        Plan:     Office Visit on 10/07/2024   Component Date Value Ref Range Status    SARS Coronavirus 2 Antigen 10/07/2024 Negative  Negative Final     Acceptable 10/07/2024 Yes   Final    Molecular Strep A, POC 10/07/2024 Negative  Negative Final     Acceptable 10/07/2024 Yes   Final         Upper respiratory infection, viral  -     Discontinue: dexbrompheniramine-phenylep-DM 2-10-20 mg Tab; Take 1 tablet by mouth every 6 to 8 hours as needed (congestion/cough/allergy).  Dispense: 15 tablet; Refill: 0  -     dexbrompheniramine-phenylep-DM 2-10-20  mg Tab; Take 1 tablet by mouth every 6 to 8 hours as needed (congestion/cough/allergy).  Dispense: 15 tablet; Refill: 0    Acute rhinosinusitis  -     Discontinue: dexbrompheniramine-phenylep-DM 2-10-20 mg Tab; Take 1 tablet by mouth every 6 to 8 hours as needed (congestion/cough/allergy).  Dispense: 15 tablet; Refill: 0  -     dexbrompheniramine-phenylep-DM 2-10-20 mg Tab; Take 1 tablet by mouth every 6 to 8 hours as needed (congestion/cough/allergy).  Dispense: 15 tablet; Refill: 0    Sore throat  -     SARS Coronavirus 2 Antigen, POCT Manual Read  -     POCT Strep A, Molecular      Patient Instructions   Please follow up with your primary care provider within 2-5 days if your signs and symptoms have not resolved or worsen.     If your condition worsens or fails to improve we recommend that you receive another evaluation at the emergency room immediately or contact your primary medical clinic to discuss your concerns.   You must understand that you have received an Urgent Care treatment only and that you may be released before all of your medical problems are known or treated. You, the patient, will arrange for follow up care as instructed.     OTC throat pain mixture Instructions    Combine the following ingredients:    10 ml of Maalox (Aluminum-magnesium hydroxide-simethicone 200-200-20 mg/5 ml) PLUS    10 ml of Tylenol (Acetaminophen 160 mg/5 ml) PLUS     5 ml of Benadryl (Diphenhydramine 12.5 mg/5 ml)     Take mixture as a single dose; gargle then swallow every 6 hours as needed for throat pain relief.  Refrigerate mixture for optimal comfort/pain relief.      You have tested NEGATIVE for COVID-19 today, however it is recommended that you repeat testing using a OTC self test kit in the next 1-2 days for confirmation.  The CDC encourages you to follow these strategies to help prevent the spread of Respiratory Viruses when you are sick:  -Stay home and away from others until you are FEVER FREE (without the use  of fever reducing medications) AND your symptoms have improved

## 2024-10-07 NOTE — PATIENT INSTRUCTIONS
Please follow up with your primary care provider within 2-5 days if your signs and symptoms have not resolved or worsen.     If your condition worsens or fails to improve we recommend that you receive another evaluation at the emergency room immediately or contact your primary medical clinic to discuss your concerns.   You must understand that you have received an Urgent Care treatment only and that you may be released before all of your medical problems are known or treated. You, the patient, will arrange for follow up care as instructed.     OTC throat pain mixture Instructions    Combine the following ingredients:    10 ml of Maalox (Aluminum-magnesium hydroxide-simethicone 200-200-20 mg/5 ml) PLUS    10 ml of Tylenol (Acetaminophen 160 mg/5 ml) PLUS     5 ml of Benadryl (Diphenhydramine 12.5 mg/5 ml)     Take mixture as a single dose; gargle then swallow every 6 hours as needed for throat pain relief.  Refrigerate mixture for optimal comfort/pain relief.      You have tested NEGATIVE for COVID-19 today, however it is recommended that you repeat testing using a OTC self test kit in the next 1-2 days for confirmation.  The CDC encourages you to follow these strategies to help prevent the spread of Respiratory Viruses when you are sick:  -Stay home and away from others until you are FEVER FREE (without the use of fever reducing medications) AND your symptoms have improved

## 2024-10-07 NOTE — LETTER
October 7, 2024      [place] [facility]  [facility] [place] [address]  Phone: [phone]  Fax: [phone]       Patient: Abran Childers   YOB: 1990  Date of Visit: 10/07/2024    To Whom It May Concern:    Shalini Childers  was at [facility] on 10/07/2024. The patient may return to work on 10/8/2024 with no restrictions. If you have any questions or concerns, or if I can be of further assistance, please do not hesitate to contact me.    Sincerely,    Lilliana Kate NP

## 2024-11-20 ENCOUNTER — PATIENT MESSAGE (OUTPATIENT)
Dept: URGENT CARE | Facility: CLINIC | Age: 34
End: 2024-11-20

## 2024-11-20 ENCOUNTER — OFFICE VISIT (OUTPATIENT)
Dept: URGENT CARE | Facility: CLINIC | Age: 34
End: 2024-11-20
Payer: OTHER GOVERNMENT

## 2024-11-20 VITALS
RESPIRATION RATE: 16 BRPM | BODY MASS INDEX: 24.64 KG/M2 | TEMPERATURE: 98 F | HEART RATE: 86 BPM | HEIGHT: 71 IN | WEIGHT: 176 LBS | OXYGEN SATURATION: 98 % | SYSTOLIC BLOOD PRESSURE: 99 MMHG | DIASTOLIC BLOOD PRESSURE: 70 MMHG

## 2024-11-20 DIAGNOSIS — M25.572 PAIN OF JOINT OF LEFT ANKLE AND FOOT: Primary | ICD-10-CM

## 2024-11-20 DIAGNOSIS — M25.572 ACUTE LEFT ANKLE PAIN: ICD-10-CM

## 2024-11-20 DIAGNOSIS — S93.492A SPRAIN OF ANTERIOR TALOFIBULAR LIGAMENT OF LEFT ANKLE, INITIAL ENCOUNTER: Primary | ICD-10-CM

## 2024-11-20 PROCEDURE — 99213 OFFICE O/P EST LOW 20 MIN: CPT | Mod: S$GLB,,, | Performed by: STUDENT IN AN ORGANIZED HEALTH CARE EDUCATION/TRAINING PROGRAM

## 2024-11-20 RX ORDER — MELOXICAM 15 MG/1
15 TABLET ORAL DAILY
Qty: 14 TABLET | Refills: 0 | Status: SHIPPED | OUTPATIENT
Start: 2024-11-20 | End: 2024-12-04

## 2024-11-20 NOTE — LETTER
November 20, 2024      [place] [facility]  [facility] [facility] [place] [address]  Phone: [phone]  Fax: [phone]       Patient: Abran Childers   YOB: 1990  Date of Visit: 11/20/2024    To Whom It May Concern:    Shalini Childers  was at [facility] on 11/20/2024. The patient may return to work/school on 11/25/2024 with no restrictions. If you have any questions or concerns, or if I can be of further assistance, please do not hesitate to contact me.    Sincerely,    MD Wei Pickens MA

## 2024-11-20 NOTE — PROGRESS NOTES
"  Subjective:      Patient ID: Abran Fischer is a 34 y.o. male.    Vitals:  height is 5' 11" (1.803 m) and weight is 79.8 kg (176 lb). His temperature is 97.8 °F (36.6 °C). His blood pressure is 99/70 and his pulse is 86. His respiration is 16 and oxygen saturation is 98%.     Chief Complaint: Ankle Injury    Pt in for LT ankle pain, he rolled his ankle while playing football. It happened a few hours ago. He was able to walk a little afterwards. The area feels swollen. He has taken ibu at 11am.  PMH of ankle sprains.     Ankle Pain   [place] The injury mechanism was a twisting injury. The pain is present in the left ankle. Quality: sharp. The pain is at a severity of 6/10. The pain is moderate. The pain has been Fluctuating since onset. Associated symptoms include an inability to bear weight. Pertinent negatives include no loss of sensation, numbness or tingling. He reports no foreign bodies present. He has tried NSAIDs for the symptoms. The treatment provided mild relief.     Musculoskeletal:  Positive for pain, trauma, joint swelling and pain with walking.   Neurological:  Negative for numbness.      Objective:     Physical Exam   Musculoskeletal:      Right ankle: Normal. Achilles tendon normal.      Left ankle: He exhibits decreased range of motion and swelling. Tenderness. Lateral malleolus tenderness found. Achilles tendon normal.      Comments: Pain with inversion, dorsiflexion/plantarflexion  Patient bears weight on the forefoot but not the rest of the foot due to pain     [facility] UNKNOWN RAD EAP    Result Date: 2024          [facility]                                RADIOLOGY REPORT    PT NAME: ABRAN FISCHER      [facility]   : 1990 M 34             [address] [facility]  ACCT: [id number]                                              [place] Rec #: [id number]               "                          [phone]  Patient Location: AL.RAD/             Procedure: ANKLE COMPLETE MIN 3V  REQUISITION #: [id number]    REPORT #: [id number]       DATE OF EXAM: 11/20/24  TIME OF EXAM:    ANKLE COMPLETE MIN 3V    Clinical indication: Left ankle sprain    FINDINGS:  No acute fracture or dislocation.    No osseous erosion, osseous lesion, or destructive process.    No soft tissue radiopaque foreign body.        IMPRESSION:  No significant radiographic abnormality identified.    Due to the possibility of subtle or occult fracture which may not be visible or subtle on initial radiographs, if symptoms persist, recommend additional  imaging with CT or MRI.    Signer Name: Karl Singer MD  Signed: 11/20/2024 4:09 PM CST  ()    DICTATING PHYSICIAN:  KARL SINGER MD                 Date Dictated: 11/20/24 1405    Signed By:  KARL SINGER MD   Date Signed:  11/20/24 1027   CC: LEXII BUCIO MD ; LEXII BUCIO MD    ADMITTING PHYSICIAN:                                                                                                ORDERING PHY: LEXII BUCIO MD                                                                                                                                                  ATTENDING PHY: LEXII BUCIO MD  Patient Status:  REG CLI  Admit Service Date: 11/20/24       Assessment:     1. Sprain of anterior talofibular ligament of left ankle, initial encounter    2. Acute left ankle pain        Plan:       Sprain of anterior talofibular ligament of left ankle, initial encounter  -     Cancel: GEL ANKLE SPLINT FOR HOME USE  -     meloxicam (MOBIC) 15 MG tablet; Take 1 tablet (15 mg total) by mouth once daily. for 14 days  Dispense: 14 tablet; Refill: 0  -     X-Ray Ankle Complete 3 View Left; Future; Expected date: 11/20/2024  -     IMMOBILIZER FOR HOME USE    Acute left ankle pain  -     Cancel: GEL ANKLE SPLINT FOR HOME USE  -     meloxicam (MOBIC)  15 MG tablet; Take 1 tablet (15 mg total) by mouth once daily. for 14 days  Dispense: 14 tablet; Refill: 0  -     X-Ray Ankle Complete 3 View Left; Future; Expected date: 11/20/2024  -     IMMOBILIZER FOR HOME USE    -handout given    Please follow up with your primary care provider within 2-5 days if your signs and symptoms have not resolved or worsen.     If your condition worsens or fails to improve we recommend that you receive another evaluation at the emergency room immediately or contact your primary medical clinic to discuss your concerns.   You must understand that you have received an Urgent Care treatment only and that you may be released before all of your medical problems are known or treated. You, the patient, will arrange for follow up care as instructed.      Please go to [facility] or [facility] ER to get the xray done. We will call you with the results.       Medical Decision Making:   Differential Diagnosis:   Sprain of the anterior talofibular ligament of the left ankle  Independently Interpreted Test(s):   I have ordered and independently interpreted X-rays - see summary below.       <> Summary of X-Ray Reading(s): No acute fracture or dislocation.        No osseous erosion, osseous lesion, or destructive process.        No soft tissue radiopaque foreign body.          Clinical Tests:   Radiological Study: Ordered and Reviewed  Urgent Care Management:  Pt in for LT ankle pain, he rolled his ankle while playing football. It happened a few hours ago. He was able to walk a little afterwards. The area feels swollen. He has taken ibu at 11am.  PMH of ankle sprains.   Vitals are within normal limits.  Physical Exam   Musculoskeletal:      Right ankle: Normal. Achilles tendon normal.      Left ankle: He exhibits decreased range of motion and swelling. Tenderness. Lateral malleolus tenderness found. Achilles tendon normal.      Comments: Pain with inversion, dorsiflexion/plantarflexion  Patient bears  weight on the forefoot but not the rest of the foot due to pain   The patient was given Mobic as needed for pain.  Patient was sent home with an ankle brace.  If the patient still has pain over 2 weeks then he will refer to orthopedics. ED and return precautions were given. The pt nini.

## 2024-11-20 NOTE — PROGRESS NOTES
Your xray was negative. Please continue to use the brace and take the pain medication as needed. If you have pain for over two weeks then please call us or return to the clinic and we can refer you to orthopedics.     Please contact me if you have any additional concerns.    Sincerely,    Ant Aponte

## 2024-11-20 NOTE — PATIENT INSTRUCTIONS
Please follow up with your primary care provider within 2-5 days if your signs and symptoms have not resolved or worsen.     If your condition worsens or fails to improve we recommend that you receive another evaluation at the emergency room immediately or contact your primary medical clinic to discuss your concerns.   You must understand that you have received an Urgent Care treatment only and that you may be released before all of your medical problems are known or treated. You, the patient, will arrange for follow up care as instructed.      Please go to [facility] or [facility] ER to get the xray done. We will call you with the results.

## 2024-12-04 ENCOUNTER — OFFICE VISIT (OUTPATIENT)
Dept: FAMILY MEDICINE | Facility: CLINIC | Age: 34
End: 2024-12-04
Payer: OTHER GOVERNMENT

## 2024-12-04 ENCOUNTER — PATIENT MESSAGE (OUTPATIENT)
Dept: FAMILY MEDICINE | Facility: CLINIC | Age: 34
End: 2024-12-04

## 2024-12-04 VITALS
OXYGEN SATURATION: 98 % | BODY MASS INDEX: 25.97 KG/M2 | HEART RATE: 61 BPM | HEIGHT: 71 IN | SYSTOLIC BLOOD PRESSURE: 116 MMHG | DIASTOLIC BLOOD PRESSURE: 80 MMHG | WEIGHT: 185.5 LBS

## 2024-12-04 DIAGNOSIS — S93.402A SPRAIN OF LEFT ANKLE, UNSPECIFIED LIGAMENT, INITIAL ENCOUNTER: Primary | ICD-10-CM

## 2024-12-04 DIAGNOSIS — M25.572 ACUTE LEFT ANKLE PAIN: Primary | ICD-10-CM

## 2024-12-04 PROCEDURE — 99214 OFFICE O/P EST MOD 30 MIN: CPT | Mod: S$GLB,,, | Performed by: NURSE PRACTITIONER

## 2024-12-04 RX ORDER — KETOROLAC TROMETHAMINE 10 MG/1
10 TABLET, FILM COATED ORAL EVERY 6 HOURS
Qty: 20 TABLET | Refills: 0 | Status: SHIPPED | OUTPATIENT
Start: 2024-12-04 | End: 2024-12-09

## 2024-12-04 NOTE — PROGRESS NOTES
Patient ID: Abran Childers  MRN: [id number]      History of Present Illness:  The patient is a 34 y.o. White male who presents to clinic for evaluation and management of a left ankle sprain. He said this happened a few weeks ago when he was playing football with his work crew and her rolled the ankle. This was on 24 and he went to [facility] where an Xray was done of the ankle . It did not show a fracture however he did have gross edema of the foot and ankle and extensive bruising. He had pictures of the initial injury and there was a lot of bruising both on the medial aspect and lateral area along with soft tissue swelling. He was provided an ankle brace and prescribed an NSAID which he said was mildly effective and he as now taken all of them. He is here today as he is still having increased amt of pain especially when walking and has great difficulty both with flexion and dorsiflexion and with lateral movements.     XRAy findings on 24:     No acute fracture or dislocation  No osseous erosion , osseous lesion , or destructive process  No soft tissue radiopaque foreign body.     Allergies: Patient has No Known Allergies.     Smoking status:  Social History     Tobacco Use   Smoking Status Former    Types: Pipe, Cigars    Start date:     Quit date: 2013    Years since quittin.9   Smokeless Tobacco Current    Types: Chew       Problem List:  There is no problem list on file for this patient.       Current Medications:  Current Outpatient Medications   Medication Instructions    ketoconazole (NIZORAL) 2 % cream Topical (Top), Daily    ketorolac (TORADOL) 10 mg, Oral, Every 6 hours           Review of Systems   Constitutional: Negative.    HENT: Negative.     Eyes: Negative.    Respiratory: Negative.     Cardiovascular: Negative.    Gastrointestinal: Negative.    Endocrine: Negative.    Genitourinary: Negative.    Musculoskeletal:  Positive for arthralgias and joint swelling.        See  "HPI    Integumentary:  Negative.   Allergic/Immunologic: Negative.    Neurological: Negative.    Hematological: Negative.    Psychiatric/Behavioral: Negative.          Visit Vitals  /80 (BP Location: Right arm, Patient Position: Sitting)   Pulse 61   Ht 5' 11" (1.803 m)   Wt 84.1 kg (185 lb 8 oz)   SpO2 98%   BMI 25.87 kg/m²       Physical Exam  Vitals and nursing note reviewed.   Constitutional:       Appearance: Normal appearance.   HENT:      Head: Normocephalic.      Nose: Nose normal.      Mouth/Throat:      Mouth: Mucous membranes are moist.   Eyes:      Conjunctiva/sclera: Conjunctivae normal.   Cardiovascular:      Rate and Rhythm: Normal rate and regular rhythm.   Pulmonary:      Effort: Pulmonary effort is normal.      Breath sounds: Normal breath sounds.   Musculoskeletal:         General: Swelling and tenderness present.      Cervical back: Normal range of motion.      Comments: Tenderness to palpation of the medial malleolus as well as the lateral area. He continues to have some soft tissue swelling medially with some fading bruising. He has pain both with flexion and dorsiflexion and lateral movements.    Skin:     General: Skin is warm and dry.   Neurological:      General: No focal deficit present.      Mental Status: He is alert.   Psychiatric:         Mood and Affect: Mood normal.         Behavior: Behavior normal.          Assessment & Plan:  1. Acute left ankle pain  -     do not take other NSAIDS while taking toradol. Take with food to reduce GI upset.   -     ketorolac (TORADOL) 10 mg tablet; Take 1 tablet (10 mg total) by mouth every 6 (six) hours. for 5 days  Dispense: 20 tablet; Refill: 0  -     X-Ray Ankle Complete Left; Future; Expected date: 12/04/2024  Continue to elevate, use ankle brace and apply ice to reduce pain and inflammation .   Consider MRI /CT scan of ankle or referral to ortho if xray is negative.          Lab Frequency Next Occurrence   Hepatitis C Antibody Once " 05/03/2024   HIV 1/2 Ag/Ab (4th Gen) Once 05/03/2024   Ambulatory referral/consult to Physical/Occupational Therapy Once 05/10/2024   Microalbumin/Creatinine Ratio, Urine Once 07/31/2024   Ambulatory referral/consult to Physical/Occupational Therapy Once 08/07/2024   Ambulatory referral/consult to Infertility Once 08/07/2024   Ambulatory referral/consult to Podiatry Once 09/25/2024   X-Ray Ankle Complete Left Once 12/04/2024       Follow up if symptoms worsen or fail to improve.      Nano Lynn, NP

## 2024-12-05 ENCOUNTER — PATIENT MESSAGE (OUTPATIENT)
Dept: FAMILY MEDICINE | Facility: CLINIC | Age: 34
End: 2024-12-05
Payer: OTHER GOVERNMENT

## 2024-12-06 NOTE — TELEPHONE ENCOUNTER
Had do a prior authorization with patient insurance before sending the referral. He has [facility] approval first.

## 2024-12-10 ENCOUNTER — PATIENT MESSAGE (OUTPATIENT)
Dept: FAMILY MEDICINE | Facility: CLINIC | Age: 34
End: 2024-12-10
Payer: OTHER GOVERNMENT

## 2024-12-11 ENCOUNTER — TELEPHONE (OUTPATIENT)
Dept: FAMILY MEDICINE | Facility: CLINIC | Age: 34
End: 2024-12-11
Payer: OTHER GOVERNMENT

## 2024-12-11 ENCOUNTER — PATIENT MESSAGE (OUTPATIENT)
Dept: FAMILY MEDICINE | Facility: CLINIC | Age: 34
End: 2024-12-11
Payer: OTHER GOVERNMENT

## 2024-12-11 DIAGNOSIS — S93.402A SPRAIN OF LEFT ANKLE, UNSPECIFIED LIGAMENT, INITIAL ENCOUNTER: Primary | ICD-10-CM

## 2024-12-11 NOTE — TELEPHONE ENCOUNTER
I had to do a prior authorization with patient insurance he has  and his office visit have to be approve by his insurance first they will not schedule him until they get that approval. I told patient this last week. They approved his MRI so I faxed that to scheduling and send him the phone number to call to schedule for his MRI. Talked with patient about this last week.

## 2024-12-13 ENCOUNTER — TELEPHONE (OUTPATIENT)
Dept: FAMILY MEDICINE | Facility: CLINIC | Age: 34
End: 2024-12-13
Payer: OTHER GOVERNMENT

## 2024-12-13 NOTE — TELEPHONE ENCOUNTER
Called patient yesterday 12/12/2024 informed that he has appointment schedule for 12/18/2024 0600 with tablet time of 0630 address was provided re-faxed and routed the order again to scheduling on yesterday 12/12/2024.    Writer called the pt and informed him of his lab results. The pt stated that he will schedule his thyroid US, and have it completed prior to his appointment on 1/7/22. Routed to provider to keep him informed.

## 2024-12-20 ENCOUNTER — TELEPHONE (OUTPATIENT)
Dept: FAMILY MEDICINE | Facility: CLINIC | Age: 34
End: 2024-12-20
Payer: OTHER GOVERNMENT

## 2024-12-20 ENCOUNTER — PATIENT MESSAGE (OUTPATIENT)
Dept: FAMILY MEDICINE | Facility: CLINIC | Age: 34
End: 2024-12-20
Payer: OTHER GOVERNMENT

## 2024-12-20 DIAGNOSIS — S93.402A SPRAIN OF LEFT ANKLE, UNSPECIFIED LIGAMENT, INITIAL ENCOUNTER: Primary | ICD-10-CM

## 2024-12-20 NOTE — TELEPHONE ENCOUNTER
Referral was faxed to Dr. Justo Roque's office alone with MRI results. Patient is aware who we was sent the referral to.

## 2024-12-20 NOTE — TELEPHONE ENCOUNTER
Referral being send to Dr. Justo Roque. Patient is aware that referral is being send to this doctor alone with the results of his MRI

## 2024-12-24 NOTE — TELEPHONE ENCOUNTER
Patient MRI was done and referral was sent to Dr. Justo Roque for patient to be seen for pain to left ankle alone with results of the MRI.

## 2025-01-09 ENCOUNTER — PATIENT MESSAGE (OUTPATIENT)
Dept: FAMILY MEDICINE | Facility: CLINIC | Age: 35
End: 2025-01-09

## 2025-01-09 NOTE — TELEPHONE ENCOUNTER
According to [facility] [email or web address], current authorization is for Dr. Gutierrez. I requested a change of provider to Dr. Roque. States requests are reviewed between 24 hours 0- 3 business days. Will continue to check on authorization.

## 2025-01-16 ENCOUNTER — TELEPHONE (OUTPATIENT)
Dept: FAMILY MEDICINE | Facility: CLINIC | Age: 35
End: 2025-01-16

## 2025-01-16 NOTE — TELEPHONE ENCOUNTER
Called Dr. Roque office, left voicemail. They will need to authorize surgery through insurance because we do not have CPT codes/DX codes or facility information.    ----- Message from Tasha sent at 1/16/2025  8:54 AM CST -----  Patient requesting a call back at [phone]... due to surgery

## 2025-01-21 ENCOUNTER — PATIENT MESSAGE (OUTPATIENT)
Dept: FAMILY MEDICINE | Facility: CLINIC | Age: 35
End: 2025-01-21

## 2025-01-28 ENCOUNTER — TELEPHONE (OUTPATIENT)
Dept: FAMILY MEDICINE | Facility: CLINIC | Age: 35
End: 2025-01-28

## 2025-01-28 NOTE — TELEPHONE ENCOUNTER
----- Message from Mitch Mayes sent at 1/27/2025  4:39 PM CST -----  Contact: [facility] -practice manager      ----- Message -----  From: Lilliana Lockhart  Sent: 1/27/2025   1:25 PM CST  To: Leah Mcgill Staff    Type:  Returning Call    Who Called: [facility] -practice manager  Does the patient know what this is regarding?: Just regarding the patient, that's all she knows  Best Call Back Number: [phone]  Additional Information: N/a

## 2025-01-28 NOTE — TELEPHONE ENCOUNTER
Procedure: NST   Indication: ITP  @ 36 2/7  Baseline: 125  Variability: moderate  Accelerations: yes  Declarations:  no    Impression: reactive NST     Talked with Tereza at dr. Justo Roque's office and she stated that we do not have to do the referral for surgery for patient because their office is doing that through patient's insurance.

## 2025-02-24 ENCOUNTER — PATIENT MESSAGE (OUTPATIENT)
Dept: FAMILY MEDICINE | Facility: CLINIC | Age: 35
End: 2025-02-24

## 2025-03-14 ENCOUNTER — PATIENT MESSAGE (OUTPATIENT)
Dept: FAMILY MEDICINE | Facility: CLINIC | Age: 35
End: 2025-03-14

## 2025-03-14 DIAGNOSIS — G89.29 CHRONIC PAIN OF LEFT ANKLE: Primary | ICD-10-CM

## 2025-03-14 DIAGNOSIS — M25.572 CHRONIC PAIN OF LEFT ANKLE: Primary | ICD-10-CM

## 2025-04-03 ENCOUNTER — PATIENT MESSAGE (OUTPATIENT)
Dept: FAMILY MEDICINE | Facility: CLINIC | Age: 35
End: 2025-04-03

## 2025-04-03 DIAGNOSIS — Z31.69 INFERTILITY COUNSELING: Primary | ICD-10-CM

## 2025-04-07 ENCOUNTER — PATIENT MESSAGE (OUTPATIENT)
Dept: FAMILY MEDICINE | Facility: CLINIC | Age: 35
End: 2025-04-07

## 2025-04-23 ENCOUNTER — PATIENT MESSAGE (OUTPATIENT)
Dept: FAMILY MEDICINE | Facility: CLINIC | Age: 35
End: 2025-04-23

## 2025-04-23 ENCOUNTER — OFFICE VISIT (OUTPATIENT)
Dept: FAMILY MEDICINE | Facility: CLINIC | Age: 35
End: 2025-04-23
Payer: OTHER GOVERNMENT

## 2025-04-23 VITALS
DIASTOLIC BLOOD PRESSURE: 70 MMHG | OXYGEN SATURATION: 99 % | WEIGHT: 185.38 LBS | TEMPERATURE: 98 F | HEART RATE: 72 BPM | SYSTOLIC BLOOD PRESSURE: 110 MMHG | BODY MASS INDEX: 25.95 KG/M2 | HEIGHT: 71 IN | RESPIRATION RATE: 15 BRPM

## 2025-04-23 DIAGNOSIS — Z31.69 INFERTILITY COUNSELING: Primary | ICD-10-CM

## 2025-04-23 PROCEDURE — 99212 OFFICE O/P EST SF 10 MIN: CPT | Mod: S$GLB,,, | Performed by: NURSE PRACTITIONER

## 2025-04-23 RX ORDER — GABAPENTIN 300 MG/1
300 CAPSULE ORAL
COMMUNITY
Start: 2025-03-20

## 2025-04-23 RX ORDER — OXYCODONE AND ACETAMINOPHEN 5; 325 MG/1; MG/1
1 TABLET ORAL EVERY 6 HOURS PRN
COMMUNITY
Start: 2025-03-26 | End: 2025-04-23 | Stop reason: ALTCHOICE

## 2025-04-23 RX ORDER — IBUPROFEN 800 MG/1
800 TABLET ORAL EVERY 6 HOURS PRN
COMMUNITY
Start: 2025-03-19

## 2025-04-23 RX ORDER — CLOMIPHENE CITRATE 50 MG/1
TABLET ORAL
COMMUNITY
Start: 2025-04-03

## 2025-04-23 RX ORDER — GABAPENTIN 100 MG/1
100 CAPSULE ORAL
COMMUNITY
Start: 2025-02-25 | End: 2025-04-23 | Stop reason: ALTCHOICE

## 2025-04-23 NOTE — PROGRESS NOTES
Patient ID: Abran Childers  MRN: [id number]      History of Present Illness:  The patient is a 34 y.o. White male who presents to clinic for evaluation and management with a chief complaint of Referral (Pt. Needs referral for fertility Dr. Jay Khan's information, will call back this afternoon with info.  Would like you to look at Lt. ankle.)     He is several weeks out from his surgery to repair the tendons and ligaments to the left ankle and incision has healed well. He is still wearing a support for the ankle. He is mainly here today to request we send both his and his wifes records to a  fertility clinic at [facility] in [place], [place].     He said they would like all of the medications he and she take as well as any smoking hx or vaping hx. , also needs BMI.     Allergies: Patient has no known allergies.     Smoking status:  Tobacco Use History[1]    Problem List:  Problem List[2]     Current Medications:  Current Outpatient Medications   Medication Instructions    CLOMID 50 mg tablet     gabapentin (NEURONTIN) 300 mg    ibuprofen (ADVIL,MOTRIN) 800 mg, Every 6 hours PRN    multivitamin with minerals tablet 1 tablet, Every morning             Review of Systems   Constitutional: Negative.    HENT: Negative.     Eyes: Negative.    Respiratory: Negative.     Cardiovascular: Negative.    Gastrointestinal: Negative.    Endocrine: Negative.    Genitourinary:         Pt reports he and his wife have been trying to have a b molina for a couple of years and have not been successful thus far. They both have been to two different civilian specialist and were not pleased with the methods they were offering and would like to see a specialist at [facility] in [place], [place] through the . He is requesting I send her records and his records to the fertility clinic there . He is supposed to be sending us information as far as exactly where to send the referral to    Musculoskeletal:  Positive for arthralgias.         "Slight discomfort to left ankle and not complete ROM but he is working on this.    Integumentary:  Negative.   Allergic/Immunologic: Negative.    Neurological: Negative.    Hematological: Negative.    Psychiatric/Behavioral: Negative.          Visit Vitals  /70 (BP Location: Left arm, Patient Position: Sitting)   Pulse 72   Temp 98.3 °F (36.8 °C) (Oral)   Resp 15   Ht 5' 11" (1.803 m)   Wt 84.1 kg (185 lb 6.4 oz)   SpO2 99%   BMI 25.86 kg/m²       Physical Exam  Vitals and nursing note reviewed.   Constitutional:       Appearance: Normal appearance.   HENT:      Head: Normocephalic.      Nose: Nose normal.      Mouth/Throat:      Mouth: Mucous membranes are moist.   Eyes:      Conjunctiva/sclera: Conjunctivae normal.   Cardiovascular:      Rate and Rhythm: Normal rate and regular rhythm.   Pulmonary:      Effort: Pulmonary effort is normal.      Breath sounds: Normal breath sounds.   Musculoskeletal:         General: Normal range of motion.      Cervical back: Normal range of motion.   Skin:     General: Skin is warm and dry.   Neurological:      General: No focal deficit present.      Mental Status: He is alert.   Psychiatric:         Mood and Affect: Mood normal.         Behavior: Behavior normal.          Assessment & Plan:  1. Infertility counseling     See above, pt is requesting we send referral to [facility] in [place]. He said there is a fertility clinic there that he and his spouse would like to go to . They have been to two civilian specialists here and were not happy with the options they were given and would like a third opinion and also will be much less costly if he goes through the .       Lab Frequency Next Occurrence   Hepatitis C Antibody Once 05/03/2024   HIV 1/2 Ag/Ab (4th Gen) Once 05/03/2024   Ambulatory referral/consult to Physical/Occupational Therapy Once 05/10/2024   Microalbumin/Creatinine Ratio, Urine Once 07/31/2024   Ambulatory referral/consult to " Physical/Occupational Therapy Once 2024   Ambulatory referral/consult to Infertility Once 2024   Ambulatory referral/consult to Podiatry Once 2024   MRI Ankle Without Contrast Left Once 12/10/2024   Ambulatory referral/consult to Orthopedics Once 2024   Ambulatory referral/consult to Physical/Occupational Therapy Once 2025   Ambulatory referral/consult to Infertility Once 04/10/2025       Follow up if symptoms worsen or fail to improve.     Nano Lynn NP                [1]   Social History  Tobacco Use   Smoking Status Former    Types: Pipe, Cigars    Start date:     Quit date:     Years since quittin.3    Passive exposure: Never   Smokeless Tobacco Current    Types: Snuff   [2] There is no problem list on file for this patient.

## 2025-05-07 ENCOUNTER — PATIENT MESSAGE (OUTPATIENT)
Dept: FAMILY MEDICINE | Facility: CLINIC | Age: 35
End: 2025-05-07
Payer: OTHER GOVERNMENT

## 2025-05-14 ENCOUNTER — OFFICE VISIT (OUTPATIENT)
Dept: FAMILY MEDICINE | Facility: CLINIC | Age: 35
End: 2025-05-14
Payer: OTHER GOVERNMENT

## 2025-05-14 VITALS
OXYGEN SATURATION: 98 % | HEIGHT: 71 IN | SYSTOLIC BLOOD PRESSURE: 124 MMHG | RESPIRATION RATE: 18 BRPM | HEART RATE: 76 BPM | DIASTOLIC BLOOD PRESSURE: 70 MMHG | WEIGHT: 187 LBS | BODY MASS INDEX: 26.18 KG/M2

## 2025-05-14 DIAGNOSIS — R30.0 BURNING WITH URINATION: Primary | ICD-10-CM

## 2025-05-14 LAB
BILIRUBIN, UA POC OHS: NEGATIVE
BLOOD, UA POC OHS: NEGATIVE
CLARITY, UA POC OHS: CLEAR
COLOR, UA POC OHS: YELLOW
GLUCOSE, UA POC OHS: NEGATIVE
KETONES, UA POC OHS: NEGATIVE
LEUKOCYTES, UA POC OHS: NEGATIVE
NITRITE, UA POC OHS: NEGATIVE
PH, UA POC OHS: 7.5
PROTEIN, UA POC OHS: NEGATIVE
SPECIFIC GRAVITY, UA POC OHS: 1.02
UROBILINOGEN, UA POC OHS: 1

## 2025-05-14 PROCEDURE — 81003 URINALYSIS AUTO W/O SCOPE: CPT | Mod: QW,S$GLB,, | Performed by: NURSE PRACTITIONER

## 2025-05-14 PROCEDURE — 99214 OFFICE O/P EST MOD 30 MIN: CPT | Mod: S$GLB,,, | Performed by: NURSE PRACTITIONER

## 2025-05-14 RX ORDER — PHENAZOPYRIDINE HYDROCHLORIDE 200 MG/1
200 TABLET, FILM COATED ORAL 3 TIMES DAILY PRN
Qty: 30 TABLET | Refills: 1 | Status: SHIPPED | OUTPATIENT
Start: 2025-05-14 | End: 2025-06-03

## 2025-05-14 NOTE — PROGRESS NOTES
Patient ID: Abran Childers  MRN: [id number]      History of Present Illness:  The patient is a 34 y.o. White male who presents to clinic for evaluation and management with a chief complaint of Urinary Pain (Pt is at clinic for painful urination that has been present for 4 days) He has only had sexual relations with his spouse who is a female. He denies any other sexual contact in over 2 years. UA was completely negative, ie no bacteria , no leukocytes, blood or protein , all wnl. He does report having a similar sensation of the burning with urination several years ago when he had chlamydia and I suggested we check for this and he agrees although he does not think he has this but is concerned as to what is causing the burning. He does not have any redness, itching to the urethral area, no swelling or tenderness to the testicular/scrotal area, no rash . He denies any flank pain , no hx of nephrolithiasis. He and his spouse are trying to have a baby and they will be going to a fertility clinic in [place] soon to see a specialist. He is taking clomid. I did review possible side effects of taking clomid in men and burning with urination is not a general side effect listed.     I have offered to do a referral to urologist as there may be other possibilities such as prostatitis and pt has agreed to see urology for evaluation of other possible causes.     Allergies: Patient has no known allergies.     Smoking status:  Tobacco Use History[1]    Problem List:  Problem List[2]     Current Medications:  Current Outpatient Medications   Medication Instructions    CLOMID 50 mg tablet     gabapentin (NEURONTIN) 300 mg    ibuprofen (ADVIL,MOTRIN) 800 mg, Every 6 hours PRN    multivitamin with minerals tablet 1 tablet, Every morning    phenazopyridine (PYRIDIUM) 200 mg, Oral, 3 times daily PRN           Review of Systems   Constitutional: Negative.    HENT: Negative.     Eyes: Negative.    Respiratory: Negative.     Cardiovascular:  "Negative.    Gastrointestinal: Negative.    Endocrine: Negative.    Genitourinary:  Positive for dysuria.   Musculoskeletal: Negative.    Integumentary:  Negative.   Allergic/Immunologic: Negative.    Neurological: Negative.    Hematological: Negative.    Psychiatric/Behavioral: Negative.          Visit Vitals  /70 (BP Location: Right arm, Patient Position: Sitting)   Pulse 76   Resp 18   Ht 5' 11" (1.803 m)   Wt 84.8 kg (187 lb)   SpO2 98%   BMI 26.08 kg/m²       Physical Exam  Vitals and nursing note reviewed.   Constitutional:       Appearance: Normal appearance.   HENT:      Head: Normocephalic.      Nose: Nose normal.      Mouth/Throat:      Mouth: Mucous membranes are moist.   Eyes:      Conjunctiva/sclera: Conjunctivae normal.   Cardiovascular:      Rate and Rhythm: Normal rate and regular rhythm.   Pulmonary:      Effort: Pulmonary effort is normal.      Breath sounds: Normal breath sounds.   Abdominal:      Palpations: Abdomen is soft.   Musculoskeletal:         General: Normal range of motion.      Cervical back: Normal range of motion.   Skin:     General: Skin is warm and dry.   Neurological:      General: No focal deficit present.      Mental Status: He is alert.   Psychiatric:         Mood and Affect: Mood normal.         Behavior: Behavior normal.          Assessment & Plan:  1. Burning with urination  -     C. trachomatis/N. gonorrhoeae by AMP DNA; Future; Expected date: 05/14/2025  -     phenazopyridine (PYRIDIUM) 200 MG tablet; Take 1 tablet (200 mg total) by mouth 3 (three) times daily as needed for Pain.  Dispense: 30 tablet; Refill: 1  -     Ambulatory referral/consult to Urology; Future; Expected date: 05/21/2025  -     POCT Urinalysis(Instrument)  Did inform patient the medication pyridium can cause the urine to be colored orange.      Lab Frequency Next Occurrence   Hepatitis C Antibody Once 05/03/2024   HIV 1/2 Ag/Ab (4th Gen) Once 05/03/2024   Ambulatory referral/consult to " Physical/Occupational Therapy Once 05/10/2024   Microalbumin/Creatinine Ratio, Urine Once 2024   Ambulatory referral/consult to Physical/Occupational Therapy Once 2024   Ambulatory referral/consult to Infertility Once 2024   Ambulatory referral/consult to Podiatry Once 2024   MRI Ankle Without Contrast Left Once 12/10/2024   Ambulatory referral/consult to Orthopedics Once 2024   Ambulatory referral/consult to Physical/Occupational Therapy Once 2025   Ambulatory referral/consult to Infertility Once 04/10/2025   Ambulatory referral/consult to Endocrinology Once 2025   C. trachomatis/N. gonorrhoeae by AMP DNA Once 2025   Ambulatory referral/consult to Urology Once 2025       Patient advised to contact our clinic if they have not heard back regarding labs , diagnostic studies or referrals within 1 week.      Nano Lynn NP                [1]   Social History  Tobacco Use   Smoking Status Former    Types: Pipe, Cigars    Start date:     Quit date:     Years since quittin.3    Passive exposure: Never   Smokeless Tobacco Current    Types: Snuff   [2] There is no problem list on file for this patient.

## 2025-05-15 ENCOUNTER — PATIENT MESSAGE (OUTPATIENT)
Dept: FAMILY MEDICINE | Facility: CLINIC | Age: 35
End: 2025-05-15
Payer: OTHER GOVERNMENT

## 2025-05-15 ENCOUNTER — TELEPHONE (OUTPATIENT)
Dept: UROLOGY | Facility: CLINIC | Age: 35
End: 2025-05-15
Payer: OTHER GOVERNMENT

## 2025-05-15 NOTE — TELEPHONE ENCOUNTER
----- Message from Ellen sent at 5/15/2025  1:20 PM CDT -----  Contact: pt  Pt calling for status of referral appt and can be reached at [phone] Thanks,

## 2025-05-15 NOTE — TELEPHONE ENCOUNTER
Pt notified referral is noted in chart. Someone should be calling him soon with appointment but he can call us again or message us via [employer].

## 2025-05-19 ENCOUNTER — PATIENT MESSAGE (OUTPATIENT)
Dept: FAMILY MEDICINE | Facility: CLINIC | Age: 35
End: 2025-05-19
Payer: OTHER GOVERNMENT

## 2025-05-19 ENCOUNTER — TELEPHONE (OUTPATIENT)
Dept: FAMILY MEDICINE | Facility: CLINIC | Age: 35
End: 2025-05-19
Payer: OTHER GOVERNMENT

## 2025-05-19 NOTE — TELEPHONE ENCOUNTER
PA for OV with Gabe De Dios NP has been approved by pts ins.    Approval scanned in pts media.

## 2025-05-28 ENCOUNTER — OFFICE VISIT (OUTPATIENT)
Dept: UROLOGY | Facility: CLINIC | Age: 35
End: 2025-05-28
Payer: OTHER GOVERNMENT

## 2025-05-28 VITALS
OXYGEN SATURATION: 98 % | BODY MASS INDEX: 24.79 KG/M2 | WEIGHT: 183 LBS | HEIGHT: 72 IN | DIASTOLIC BLOOD PRESSURE: 70 MMHG | SYSTOLIC BLOOD PRESSURE: 102 MMHG | HEART RATE: 79 BPM

## 2025-05-28 DIAGNOSIS — R30.0 BURNING WITH URINATION: Primary | ICD-10-CM

## 2025-05-28 LAB
BILIRUBIN, UA POC OHS: NEGATIVE
BLOOD, UA POC OHS: NEGATIVE
CLARITY, UA POC OHS: CLEAR
COLOR, UA POC OHS: YELLOW
GLUCOSE, UA POC OHS: NEGATIVE
KETONES, UA POC OHS: ABNORMAL
LEUKOCYTES, UA POC OHS: NEGATIVE
NITRITE, UA POC OHS: NEGATIVE
PH, UA POC OHS: 6.5
PROTEIN, UA POC OHS: NEGATIVE
SPECIFIC GRAVITY, UA POC OHS: 1.01
UROBILINOGEN, UA POC OHS: 0.2

## 2025-05-28 PROCEDURE — 99212 OFFICE O/P EST SF 10 MIN: CPT | Mod: S$PBB,,, | Performed by: NURSE PRACTITIONER

## 2025-05-28 RX ORDER — SULFAMETHOXAZOLE AND TRIMETHOPRIM 800; 160 MG/1; MG/1
TABLET ORAL
COMMUNITY
Start: 2025-05-18

## 2025-05-28 NOTE — PROGRESS NOTES
Subjective:       Patient ID: Abran Childers is a 34 y.o. male.    Chief Complaint: Dysuria      HPI: 34-year-old male, new to [facility], referred by primary care.    Patient was referred for burning with urination.    He had an STD panel showing no STDs.    Urinalysis was normal with no infection.    Since being seen by his primary care provider the patient went to an urgent care.  He was put on Bactrim DS for 10 days.    Patient states he has burning with urination has since stopped.    States he was primarily having burning after urination.      At this time he denies any pain or burning urination.  Denies any odor to the urine denies any fever or body aches.  Denies any blood in urine.    No other urinary complaints at this time.         Past Medical History:   Past Medical History:   Diagnosis Date    Chronic back pain     Male infertility, unspecified     Plantar fasciitis        Past Surgical Historical:   Past Surgical History:   Procedure Laterality Date    ANKLE SURGERY Left     pyloricstenois      infant age        Medications:   Medication List with Changes/Refills   Current Medications    CLOMID 50 MG TABLET        GABAPENTIN (NEURONTIN) 300 MG CAPSULE    Take 300 mg by mouth.    IBUPROFEN (ADVIL,MOTRIN) 800 MG TABLET    Take 800 mg by mouth every 6 (six) hours as needed.    MULTIVITAMIN WITH MINERALS TABLET    Take 1 tablet by mouth every morning.    PHENAZOPYRIDINE (PYRIDIUM) 200 MG TABLET    Take 1 tablet (200 mg total) by mouth 3 (three) times daily as needed for Pain.    SULFAMETHOXAZOLE-TRIMETHOPRIM 800-160MG (BACTRIM DS) 800-160 MG TAB    TAKE 1 TABLET BY MOUTH EVERY 12 HOURS FOR 7 DAYS        Past Social History: Social History[1]    Allergies: Review of patient's allergies indicates:  No Known Allergies     Family History:   Family History   Problem Relation Name Age of Onset    Cerebral aneurysm Mother  47        Review of Systems:  Review of Systems   Constitutional:  Negative for  activity change and appetite change.   HENT:  Negative for congestion and dental problem.    Respiratory:  Negative for chest tightness and shortness of breath.    Cardiovascular:  Negative for chest pain.   Gastrointestinal:  Negative for abdominal distention and abdominal pain.   Genitourinary:  Negative for decreased urine volume, difficulty urinating, dysuria, enuresis, flank pain, frequency, genital sores, hematuria, penile discharge, penile pain, penile swelling, scrotal swelling, testicular pain and urgency.   Musculoskeletal:  Negative for back pain and neck pain.   Neurological:  Negative for dizziness.   Hematological:  Negative for adenopathy.   Psychiatric/Behavioral:  Negative for agitation, behavioral problems and confusion.        Physical Exam:  Physical Exam  Vitals and nursing note reviewed.   Constitutional:       Appearance: He is well-developed.   HENT:      Head: Normocephalic.   Cardiovascular:      Rate and Rhythm: Normal rate and regular rhythm.      Heart sounds: Normal heart sounds.   Pulmonary:      Effort: Pulmonary effort is normal.      Breath sounds: Normal breath sounds.   Abdominal:      General: Bowel sounds are normal.      Palpations: Abdomen is soft.   Skin:     General: Skin is warm and dry.   Neurological:      Mental Status: He is alert and oriented to person, place, and time.       Urinalysis: Trace ketones.    Assessment/Plan:   Burning with urination: Patient's burning has resolved since taking Bactrim DS for 10 days.    Urinalysis today is normal with no signs of infection.    Patient will notify us for any recurrence of symptoms.      Follow-up as needed  Problem List Items Addressed This Visit    None  Visit Diagnoses         Burning with urination                          [1]   Social History  Socioeconomic History    Marital status:    Tobacco Use    Smoking status: Former     Types: Pipe, Cigars     Start date: 2010     Quit date: 2013     Years since  quittin.4     Passive exposure: Never    Smokeless tobacco: Current     Types: Snuff   Vaping Use    Vaping status: Never Used   Substance and Sexual Activity    Alcohol use: Yes     Alcohol/week: 2.0 standard drinks of alcohol     Types: 2 Cans of beer per week    Drug use: Never    Sexual activity: Yes     Partners: Female     Social Drivers of Health     Financial Resource Strain: Low Risk  (2024)    Overall Financial Resource Strain (CARDIA)     Difficulty of Paying Living Expenses: Not very hard   Food Insecurity: No Food Insecurity (2024)    Hunger Vital Sign     Worried About Running Out of Food in the Last Year: Never true     Ran Out of Food in the Last Year: Never true   Transportation Needs: No Transportation Needs (2024)    PRAPARE - Transportation     Lack of Transportation (Medical): No     Lack of Transportation (Non-Medical): No   Physical Activity: Sufficiently Active (2024)    Exercise Vital Sign     Days of Exercise per Week: 3 days     Minutes of Exercise per Session: 60 min   Stress: Stress Concern Present (2024)    [employer] - Occupational Stress Questionnaire     Feeling of Stress : To some extent   Housing Stability: Unknown (2024)    Housing Stability Vital Sign     Unable to Pay for Housing in the Last Year: No